# Patient Record
Sex: MALE | Race: WHITE
[De-identification: names, ages, dates, MRNs, and addresses within clinical notes are randomized per-mention and may not be internally consistent; named-entity substitution may affect disease eponyms.]

---

## 2017-07-25 ENCOUNTER — HOSPITAL ENCOUNTER (EMERGENCY)
Dept: HOSPITAL 45 - C.EDB | Age: 32
Discharge: HOME | End: 2017-07-25
Payer: COMMERCIAL

## 2017-07-25 VITALS
WEIGHT: 235.45 LBS | HEIGHT: 70.98 IN | BODY MASS INDEX: 32.96 KG/M2 | HEIGHT: 70.98 IN | WEIGHT: 235.45 LBS | BODY MASS INDEX: 32.96 KG/M2

## 2017-07-25 VITALS — HEART RATE: 84 BPM | SYSTOLIC BLOOD PRESSURE: 179 MMHG | OXYGEN SATURATION: 94 % | DIASTOLIC BLOOD PRESSURE: 104 MMHG

## 2017-07-25 VITALS — TEMPERATURE: 98.42 F

## 2017-07-25 DIAGNOSIS — F41.9: ICD-10-CM

## 2017-07-25 DIAGNOSIS — F17.200: ICD-10-CM

## 2017-07-25 DIAGNOSIS — Z76.0: Primary | ICD-10-CM

## 2017-07-25 NOTE — EMERGENCY ROOM VISIT NOTE
History


Report prepared by Elva:  Domenico Tejeda


Under the Supervision of:  Dr. Augustina Man M.D.


First contact with patient:  19:12


Chief Complaint:  MEDICATION REFILL REQUEST


Stated Complaint:  ANXIETY JOY SCHULZ,HAVE HAD SEIZURES BEFORE FOR





History of Present Illness


The patient is a 32 year old male who presents to the Emergency Room with 

complaints of persistent anxiety starting yesterday. The patient states that he 

travels a lot and is prescribed one-half mg of Xanax twice a day by his PCP in 

[place]. The patient ran out of his prescription 2 days ago. He has a history 

of seizures related to anxiety occurring 2 years ago. He denies fevers, chills, 

or any other complaints.





   Source of History:  patient


   Onset:  yesterday


   Position:  other (global)


   Quality:  other (anxiety)


   Timing:  other (persistent)


   Associated Symptoms:  No fevers, No chills





Review of Systems


See HPI for pertinent positives & negatives. A total of 6 systems reviewed and 

were otherwise negative.





Past Medical & Surgical


Medical Problems:


(1) Anxiety


(2) Seizure








Family History





Patient reports no known family medical history.





Social History


Smoking Status:  Current Every Day Smoker


Alcohol Use:  none


Marital Status:  single


Occupation Status:  employed





Current/Historical Medications


Scheduled


Alprazolam (Xanax), 0.5 MG PO BID


Metoprolol Tartrate (Lopressor) (Lopressor), 50 MG PO DAILY





Allergies


Coded Allergies:  


     Tramadol (Verified  Allergy, Severe, SEIZURE, 7/25/17)





Physical Exam


Vital Signs











  Date Time  Temp Pulse Resp B/P (MAP) Pulse Ox O2 Delivery O2 Flow Rate FiO2


 


7/25/17 19:59  84 16 179/104 94   


 


7/25/17 19:08 36.9 99 18 162/91 93 Room Air  











Physical Exam


Vital signs reviewed.





General: Well-appearing 32-year-old male, in no significant distress.





HEENT: No scleral icterus, PERRLA, neck supple.  Atraumatic.





Cardiovascular: Regular rate and rhythm, no extra sounds.





Pulmonary: Clear to auscultation bilaterally, normal work of breathing.





Abdomen: Soft, nontender, nondistended, positive bowel sounds.





Musculoskeletal: Atraumatic, no peripheral edema.





Neurologic: Patient awake alert and oriented x 3





Skin: Warm, dry, no rash





Medical Decision & Procedures


Medications Administered











 Medications


  (Trade)  Dose


 Ordered  Sig/Heraclio


 Route  Start Time


 Stop Time Status Last Admin


Dose Admin


 


 Alprazolam


  (Xanax Tab)  0.5 mg  NOW  STAT


 PO  7/25/17 19:31


 7/25/17 19:32 DC 7/25/17 19:39


0.5 MG











ED Course


1912: Past medical records reviewed. The patient was evaluated in room A03. A 

complete history and physical examination was performed. 





1931: Xanax Tab 0.5 mg PO. Upon reevaluation, the patient appeared to have 

improvement of his symptoms. I discussed findings with him. He verbalized 

agreement of the treatment plan. He was discharged home.





Medical Decision


This patient was evaluated and appeared to be in no significant distress.  

Patient was searched and the [place] and found to have several short Xanax 

prescriptions from primarily ER providers.  He states he does have a chronic 

prescription for Xanax however I feel uncomfortable with renewing a 

prescription at this time.  He was given one tablet of 0.5 mg Xanax.  He 

requested to take it home and use of over bed.  The patient will be discharged 

follow-up with the PCP and return to the ER for worsening of symptoms or any 

medical concerns.





[place] Drug Monitoring [place]


Search Results:  patient reviewed within database





Blood Pressure Screening


Patient's blood pressure:  Elevated blood pressure


Blood pressure disposition:  Elevated BP felt to be situational





Impression





 Primary Impression:  


 Encounter for medication refill





Scribe Attestation


The scribe's documentation has been prepared under my direction and personally 

reviewed by me in its entirety. I confirm that the note above accurately 

reflects all work, treatment, procedures, and medical decision making performed 

by me.





Departure Information


Dispostion


Home / Self-Care





Referrals


No Doctor Assigned





Forms


HOME CARE DOCUMENTATION FORM,                                                 

               IMPORTANT VISIT INFORMATION, WORK / SCHOOL INSTRUCTIONS





Patient Instructions


My [facility]





Additional Instructions





Take xanax 0.5 mg tonight.





Follow up with your doctor upon return home for medication review.





Return to the ED for worsening of symptoms or any medical concerns.

## 2019-04-15 ENCOUNTER — HOSPITAL ENCOUNTER (EMERGENCY)
Age: 34
Discharge: HOME | End: 2019-04-15
Attending: EMERGENCY MEDICINE
Payer: COMMERCIAL

## 2019-04-15 VITALS
DIASTOLIC BLOOD PRESSURE: 100 MMHG | HEIGHT: 71 IN | SYSTOLIC BLOOD PRESSURE: 148 MMHG | RESPIRATION RATE: 18 BRPM | WEIGHT: 235 LBS | TEMPERATURE: 97.7 F | OXYGEN SATURATION: 97 % | BODY MASS INDEX: 32.9 KG/M2 | HEART RATE: 68 BPM

## 2019-04-15 DIAGNOSIS — K08.89 PAIN, DENTAL: Primary | ICD-10-CM

## 2019-04-15 PROCEDURE — 99282 EMERGENCY DEPT VISIT SF MDM: CPT

## 2019-04-15 PROCEDURE — 6370000000 HC RX 637 (ALT 250 FOR IP): Performed by: EMERGENCY MEDICINE

## 2019-04-15 RX ORDER — AMOXICILLIN AND CLAVULANATE POTASSIUM 875; 125 MG/1; MG/1
1 TABLET, FILM COATED ORAL ONCE
Status: COMPLETED | OUTPATIENT
Start: 2019-04-15 | End: 2019-04-15

## 2019-04-15 RX ORDER — AMOXICILLIN AND CLAVULANATE POTASSIUM 875; 125 MG/1; MG/1
1 TABLET, FILM COATED ORAL 2 TIMES DAILY
Qty: 20 TABLET | Refills: 0 | Status: SHIPPED | OUTPATIENT
Start: 2019-04-15 | End: 2019-04-25

## 2019-04-15 RX ADMIN — AMOXICILLIN AND CLAVULANATE POTASSIUM 1 TABLET: 875; 125 TABLET, FILM COATED ORAL at 04:13

## 2019-04-15 ASSESSMENT — PAIN SCALES - GENERAL: PAINLEVEL_OUTOF10: 10

## 2019-04-15 ASSESSMENT — ENCOUNTER SYMPTOMS
RESPIRATORY NEGATIVE: 1
EYES NEGATIVE: 1
GASTROINTESTINAL NEGATIVE: 1

## 2019-04-15 ASSESSMENT — PAIN DESCRIPTION - FREQUENCY: FREQUENCY: CONTINUOUS

## 2019-04-15 ASSESSMENT — PAIN DESCRIPTION - DESCRIPTORS: DESCRIPTORS: ACHING

## 2019-04-15 NOTE — ED NOTES

## 2019-04-15 NOTE — ED PROVIDER NOTES
EMERGENCY DEPARTMENT ENCOUNTER    Pt Name: Blanca Luther  MRN: [id number]  [place] 1985  Date of evaluation: 4/15/19  CHIEF COMPLAINT       Chief Complaint   Patient presents with    Dental Pain     HISTORY OF PRESENT ILLNESS   The pt presents for evaluation of dental pain. Started tonight. No fever or facial swelling. Hurts on the upper right side. The history is provided by the patient. Dental Pain   Location:  Upper  Quality:  Aching  Severity:  Moderate  Onset quality:  Sudden  Duration:  2 hours  Timing:  Constant  Progression:  Unchanged  Chronicity:  New  Relieved by:  Nothing  Worsened by:  Cold food/drink and hot food/drink  Ineffective treatments:  None tried  Associated symptoms: no congestion and no fever        REVIEW OF SYSTEMS     Review of Systems   Constitutional: Negative. Negative for chills and fever. HENT: Positive for dental problem. Negative for congestion. Eyes: Negative. Respiratory: Negative. Cardiovascular: Negative. Gastrointestinal: Negative. Genitourinary: Negative. Musculoskeletal: Negative. Skin: Negative. Neurological: Negative. All other systems reviewed and are negative. PASTMEDICAL HISTORY     Past Medical History:   Diagnosis Date    Anxiety      SURGICAL HISTORY     History reviewed. No pertinent surgical history. CURRENT MEDICATIONS       Previous Medications    ALPRAZOLAM (XANAX) 0.5 MG TABLET    Take 0.5 mg by mouth 2 times daily     ALLERGIES     is allergic to ultram [tramadol hcl]. FAMILY HISTORY     has no family status information on file.       SOCIAL HISTORY       Social History     Tobacco Use    Smoking status: Current Every Day Smoker     Packs/day: 1.00    Smokeless tobacco: Current User   Substance Use Topics    Alcohol use: Yes     Comment: occ    Drug use: No     PHYSICAL EXAM     INITIAL VITALS: BP (!) 151/111   Pulse 68   Temp 97.7 °F (36.5 °C) (Oral)   Resp 18   Ht 5' 11\" (1.803 m)   Wt 235 lb mouth 2 times daily for 10 days     Dispense:  20 tablet     Refill:  0     CONSULTS:  None    FINAL IMPRESSION      1. Pain, dental          DISPOSITION/PLAN   DISPOSITION Decision To Discharge 04/15/2019 04:10:14 AM      PATIENT REFERRED TO:  No follow-up provider specified.   DISCHARGE MEDICATIONS:  New Prescriptions    AMOXICILLIN-CLAVULANATE (AUGMENTIN) 875-125 MG PER TABLET    Take 1 tablet by mouth 2 times daily for 10 days     Steven Mccoy MD  Attending Emergency Physician                    Kena Swan MD  04/15/19 [id number]